# Patient Record
Sex: MALE | URBAN - METROPOLITAN AREA
[De-identification: names, ages, dates, MRNs, and addresses within clinical notes are randomized per-mention and may not be internally consistent; named-entity substitution may affect disease eponyms.]

---

## 2022-09-12 ENCOUNTER — APPOINTMENT (OUTPATIENT)
Dept: URBAN - METROPOLITAN AREA CLINIC 252 | Age: 27
Setting detail: DERMATOLOGY
End: 2022-09-13

## 2022-09-12 VITALS — HEIGHT: 71 IN | WEIGHT: 170 LBS

## 2022-09-12 DIAGNOSIS — L70.0 ACNE VULGARIS: ICD-10-CM

## 2022-09-12 PROCEDURE — OTHER ISOTRETINOIN INITIATION: OTHER

## 2022-09-12 PROCEDURE — OTHER VENIPUNCTURE: OTHER

## 2022-09-12 PROCEDURE — OTHER ORDER TESTS: OTHER

## 2022-09-12 PROCEDURE — 36415 COLL VENOUS BLD VENIPUNCTURE: CPT

## 2022-09-12 PROCEDURE — OTHER PRESCRIPTION: OTHER

## 2022-09-12 PROCEDURE — 99204 OFFICE O/P NEW MOD 45 MIN: CPT

## 2022-09-12 PROCEDURE — OTHER ADDITIONAL NOTES: OTHER

## 2022-09-12 RX ORDER — ISOTRETINOIN 20 MG/1
20MG CAPSULE, LIQUID FILLED ORAL
Qty: 30 | Refills: 0 | Status: ERX | COMMUNITY
Start: 2022-09-12

## 2022-09-12 ASSESSMENT — LOCATION SIMPLE DESCRIPTION DERM
LOCATION SIMPLE: LEFT CHEEK
LOCATION SIMPLE: LEFT UPPER ARM

## 2022-09-12 ASSESSMENT — LOCATION DETAILED DESCRIPTION DERM
LOCATION DETAILED: LEFT CENTRAL MALAR CHEEK
LOCATION DETAILED: LEFT ANTECUBITAL SKIN

## 2022-09-12 ASSESSMENT — LOCATION ZONE DERM
LOCATION ZONE: FACE
LOCATION ZONE: ARM

## 2022-09-12 NOTE — HPI: PIMPLES (ACNE)
What Type Of Note Output Would You Prefer (Optional)?: Bullet Format
How Severe Is Your Acne?: moderate
Is This A New Presentation, Or A Follow-Up?: Acne
Additional Comments (Use Complete Sentences): Spent $2500 on laser treatment\\nDrinks whey protein \\nNo hx of GI issues

## 2022-10-13 ENCOUNTER — APPOINTMENT (OUTPATIENT)
Dept: URBAN - METROPOLITAN AREA CLINIC 252 | Age: 27
Setting detail: DERMATOLOGY
End: 2022-10-14

## 2022-10-13 VITALS — WEIGHT: 170 LBS | RESPIRATION RATE: 16 BRPM | HEIGHT: 71 IN

## 2022-10-13 DIAGNOSIS — L70.0 ACNE VULGARIS: ICD-10-CM

## 2022-10-13 PROCEDURE — OTHER ISOTRETINOIN MONITORING: OTHER

## 2022-10-13 PROCEDURE — 99214 OFFICE O/P EST MOD 30 MIN: CPT

## 2022-10-13 PROCEDURE — OTHER PRESCRIPTION: OTHER

## 2022-10-13 PROCEDURE — OTHER MIPS QUALITY: OTHER

## 2022-10-13 RX ORDER — ISOTRETINOIN 40 MG/1
40MG CAPSULE, LIQUID FILLED ORAL
Qty: 30 | Refills: 0 | Status: ERX | COMMUNITY
Start: 2022-10-13

## 2022-10-13 ASSESSMENT — LOCATION ZONE DERM: LOCATION ZONE: FACE

## 2022-10-13 ASSESSMENT — LOCATION DETAILED DESCRIPTION DERM: LOCATION DETAILED: LEFT CENTRAL MALAR CHEEK

## 2022-10-13 ASSESSMENT — LOCATION SIMPLE DESCRIPTION DERM: LOCATION SIMPLE: LEFT CHEEK

## 2022-10-13 NOTE — PROCEDURE: ISOTRETINOIN MONITORING
Months Of Therapy Completed: 1
Headache Monitoring: I recommended monitoring the headaches for now. There is no evidence of increased intracranial pressure. They were instructed to call if the headaches are worsening.
Hypercholesterolemia Monitoring: I explained this is common when taking isotretinoin. We will monitor closely.
Use Enhanced Counseling Feature (Automatic): No
Upper Range (In Mg/Kg): 150
Cheilitis Aggressive Treatment: I recommended application of Vaseline or Aquaphor numerous times a day (as often as every hour) and before going to bed. I also prescribed a topical steroid for twice daily use.
Retinoid Dermatitis Normal Treatment: I recommended more frequent application of Cetaphil or CeraVe to the areas of dermatitis.
Retinoid Dermatitis Normal Treatment: - Recommended application of Cetaphil or CeraVe cream numerous times a day and before going to bed to all dry and rashy areas.
Pounds Preamble Statement (Weight Entered In Details Tab): Reported Weight in pounds:
Myalgia Treatment: I explained this is common when taking isotretinoin. If this worsens they will contact us. They may try OTC ibuprofen.
Xerosis Normal Treatment: - Recommended application of Cetaphil or CeraVe cream numerous times a day and before going to bed to all dry areas.
Retinoid Dermatitis Aggressive Treatment: I recommended more frequent application of Cetaphil or CeraVe to the areas of dermatitis. I also prescribed a topical steroid for twice daily use until the dermatitis resolves.
Use Therapeutic Ranged Or Therapeutic Target: please select Range or Target
Cheilitis Normal Treatment: - Recommended application of Vaseline ointment or Aquaphor numerous times a day (as often as every hour) and before going to bed.
Retinoid Dermatitis Aggressive Treatment: - Recommended application of Cetaphil or CeraVe cream numerous times a day and before going to bed to all dry and rashy areas.\\n- Also, will prescribe a topical steroid for twice daily use as needed until pruritus is resolved for never longer than 14 days per month.
Next Month's Dosage: Continue Current Dosage
Myalgia Monitoring: I explained this is common when taking isotretinoin. If this worsens they will contact us.
Lower Range (In Mg/Kg): 120
What Is The Patient's Gender: Male
Nosebleeds Normal Treatment: I explained this is common when taking isotretinoin. I recommended saline mist in each nostril multiple times a day. If this worsens they will contact us.
Male Completion Statement: - After discussing his treatment course we decided to discontinue isotretinoin therapy at this time.\\n- He shouldn't donate blood for one month after the last dose. He should call with any new symptoms of depression.\\n- Discussed maintenance options.
Xerosis Aggressive Treatment: I recommended application of Cetaphil or CeraVe numerous times a day going to bed to all dry areas. I also prescribed a topical steroid for twice daily use.
Weight Units: pounds
Cheilitis Normal Treatment: I recommended application of Vaseline or Aquaphor numerous times a day (as often as every hour) and before going to bed.
Show Text Field For Brand Names Of Contraception?: Yes
Cheilitis Aggressive Treatment: - Recommended application of Dr. Dan’s Cortibalm used several times per day (as often as every hour you are awake when very severe lip dryness is present).\\n- Once improved switch back to Vaseline or Aquaphor numerous times a day and before going to bed.
Hypertriglyceridemia Monitoring: - I explained this is common when taking isotretinoin. \\n- Advised that taking over-the-counter fish oil can be helpful to reduce triglycerides.\\n- We will continue to monitor.
Myalgia Treatment: - Explained this is common when taking isotretinoin. \\n- May try OTC ibuprofen no more frequently than every 6 hours.\\n- If this worsens, then contact us.
Xerosis Aggressive Treatment: - Recommended application of Cetaphil or CeraVe cream numerous times a day and before going to bed to all dry areas.\\n- Also, will prescribe a topical steroid for twice daily use as needed until pruritus is resolved for never longer than 14 days per month.
Dosing Month 1 (Required For Cumulative Dosing): 20mg Daily
Myalgia Monitoring: - At this point, patient reports that this is mild and tolerable. Explained this is common when taking isotretinoin. If this worsens, contact us.
Kilograms Preamble Statement (Weight Entered In Details Tab): Reported Weight in kilograms:
Female Pregnancy Counseling Text: Female patients should also be on two forms of birth control while taking this medication and for one month after their last dose.
Target Cumulative Dosage (In Mg/Kg): 135
Is Cheilitis Present?: Yes - Normal Treatment
Female Completion Statement: FEMALE PATIENT - FINAL VISIT \\n- Discussed that we will be discontinuing isotretinoin today.\\n- Reminded that it is still imperative to maintain 100% compliance with the same methods of birth control she has been using throughout this course of isotretinoin for at least 30 days beyond her final pill. \\n- Advise that a final urine pregnancy test will be necessary to complete the retirements of the [employer] program in 30-37 days from now. \\n- Advised to schedule a nurse visit for this final UPT.   \\n- Reviewed the possible expectations for her acne from here on out.\\n- Discussed maintenance options.\\n- Urine pregnant test performed today.
Headache Monitoring: - Recommended monitoring the headaches for now. There is no evidence of increased intracranial pressure. They were instructed to call if the headaches are worsening.
Detail Level: Zone
Counseling Text: I reviewed the side effect in detail. Patient should get monthly blood tests, not donate blood, not drive at night if vision affected, and not share medication.
Comments: Recommended fish oil. Won’t need labs till patient at highest dose.
Nosebleeds Normal Treatment: - I explained nosebleeds are common when taking isotretinoin. \\n- Recommended saline mist in each nostril multiple times a day. \\n- May also consider applying Aquaphor intranasally once every night.\\n- If this worsens, call the clinic. Patient expressed understanding.
Xerosis Normal Treatment: I recommended application of Cetaphil or CeraVe numerous times a day going to bed to all dry areas.

## 2022-11-16 ENCOUNTER — APPOINTMENT (OUTPATIENT)
Dept: URBAN - METROPOLITAN AREA CLINIC 252 | Age: 27
Setting detail: DERMATOLOGY
End: 2022-11-16

## 2022-11-16 VITALS — HEIGHT: 72 IN | WEIGHT: 173 LBS

## 2022-11-16 DIAGNOSIS — L70.0 ACNE VULGARIS: ICD-10-CM

## 2022-11-16 PROCEDURE — OTHER ISOTRETINOIN MONITORING: OTHER

## 2022-11-16 PROCEDURE — OTHER PRESCRIPTION: OTHER

## 2022-11-16 PROCEDURE — 99214 OFFICE O/P EST MOD 30 MIN: CPT

## 2022-11-16 RX ORDER — ISOTRETINOIN 30 MG/1
30 CAPSULE, LIQUID FILLED ORAL TWICE PER DAY
Qty: 60 | Refills: 0 | Status: ERX | COMMUNITY
Start: 2022-11-16

## 2022-11-16 ASSESSMENT — LOCATION DETAILED DESCRIPTION DERM: LOCATION DETAILED: LEFT CENTRAL MALAR CHEEK

## 2022-11-16 ASSESSMENT — LOCATION SIMPLE DESCRIPTION DERM: LOCATION SIMPLE: LEFT CHEEK

## 2022-11-16 ASSESSMENT — LOCATION ZONE DERM: LOCATION ZONE: FACE

## 2022-11-16 NOTE — PROCEDURE: ISOTRETINOIN MONITORING
Headache Monitoring: - Recommended monitoring the headaches for now. There is no evidence of increased intracranial pressure. They were instructed to call if the headaches are worsening.
Hypertriglyceridemia Monitoring: - I explained this is common when taking isotretinoin. \\n- Advised that taking over-the-counter fish oil can be helpful to reduce triglycerides.\\n- We will continue to monitor.
Add High Risk Medication Management Associated Diagnosis?: Yes
Retinoid Dermatitis Aggressive Treatment: I recommended more frequent application of Cetaphil or CeraVe to the areas of dermatitis. I also prescribed a topical steroid for twice daily use until the dermatitis resolves.
What Is The Patient's Gender: Male
Retinoid Dermatitis Aggressive Treatment: - Recommended application of Cetaphil or CeraVe cream numerous times a day and before going to bed to all dry and rashy areas.\\n- Also, will prescribe a topical steroid for twice daily use as needed until pruritus is resolved for never longer than 14 days per month.
Nosebleeds Normal Treatment: I explained this is common when taking isotretinoin. I recommended saline mist in each nostril multiple times a day. If this worsens they will contact us.
Cheilitis Normal Treatment: - Recommended application of Vaseline ointment or Aquaphor numerous times a day (as often as every hour) and before going to bed.
Counseling Text: I reviewed the side effect in detail. Patient should get monthly blood tests, not donate blood, not drive at night if vision affected, and not share medication.
Retinoid Dermatitis Normal Treatment: - Recommended application of Cetaphil or CeraVe cream numerous times a day and before going to bed to all dry and rashy areas.
Hypercholesterolemia Monitoring: I explained this is common when taking isotretinoin. We will monitor closely.
Use Enhanced Counseling Feature (Automatic): No
Detail Level: Zone
Target Cumulative Dosage (In Mg/Kg): 135
Upper Range (In Mg/Kg): 150
Xerosis Aggressive Treatment: I recommended application of Cetaphil or CeraVe numerous times a day going to bed to all dry areas. I also prescribed a topical steroid for twice daily use.
Female Completion Statement: FEMALE PATIENT - FINAL VISIT \\n- Discussed that we will be discontinuing isotretinoin today.\\n- Reminded that it is still imperative to maintain 100% compliance with the same methods of birth control she has been using throughout this course of isotretinoin for at least 30 days beyond her final pill. \\n- Advise that a final urine pregnancy test will be necessary to complete the retirements of the [employer] program in 30-37 days from now. \\n- Advised to schedule a nurse visit for this final UPT.   \\n- Reviewed the possible expectations for her acne from here on out.\\n- Discussed maintenance options.\\n- Urine pregnant test performed today.
Weight Units: pounds
Use Therapeutic Ranged Or Therapeutic Target: please select Range or Target
Myalgia Monitoring: - At this point, patient reports that this is mild and tolerable. Explained this is common when taking isotretinoin. If this worsens, contact us.
Myalgia Treatment: - Explained this is common when taking isotretinoin. \\n- May try OTC ibuprofen no more frequently than every 6 hours.\\n- If this worsens, then contact us.
Xerosis Aggressive Treatment: - Recommended application of Cetaphil or CeraVe cream numerous times a day and before going to bed to all dry areas.\\n- Also, will prescribe a topical steroid for twice daily use as needed until pruritus is resolved for never longer than 14 days per month.
Months Of Therapy Completed: 2
Myalgia Treatment: I explained this is common when taking isotretinoin. If this worsens they will contact us. They may try OTC ibuprofen.
Cheilitis Aggressive Treatment: - Recommended application of Dr. Dan’s Cortibalm used several times per day (as often as every hour you are awake when very severe lip dryness is present).\\n- Once improved switch back to Vaseline or Aquaphor numerous times a day and before going to bed.
Xerosis Normal Treatment: - Recommended application of Cetaphil or CeraVe cream numerous times a day and before going to bed to all dry areas.
Male Completion Statement: - After discussing his treatment course we decided to discontinue isotretinoin therapy at this time.\\n- He shouldn't donate blood for one month after the last dose. He should call with any new symptoms of depression.\\n- Discussed maintenance options.
Headache Monitoring: I recommended monitoring the headaches for now. There is no evidence of increased intracranial pressure. They were instructed to call if the headaches are worsening.
Nosebleeds Normal Treatment: - I explained nosebleeds are common when taking isotretinoin. \\n- Recommended saline mist in each nostril multiple times a day. \\n- May also consider applying Aquaphor intranasally once every night.\\n- If this worsens, call the clinic. Patient expressed understanding.
Xerosis Normal Treatment: I recommended application of Cetaphil or CeraVe numerous times a day going to bed to all dry areas.
Lower Range (In Mg/Kg): 120
Pounds Preamble Statement (Weight Entered In Details Tab): Reported Weight in pounds:
Kilograms Preamble Statement (Weight Entered In Details Tab): Reported Weight in kilograms:
Is Cheilitis Present?: Yes - Normal Treatment
Retinoid Dermatitis Normal Treatment: I recommended more frequent application of Cetaphil or CeraVe to the areas of dermatitis.
Next Month's Dosage: Continue Current Dosage
Female Pregnancy Counseling Text: Female patients should also be on two forms of birth control while taking this medication and for one month after their last dose.
Cheilitis Normal Treatment: I recommended application of Vaseline or Aquaphor numerous times a day (as often as every hour) and before going to bed.
Myalgia Monitoring: I explained this is common when taking isotretinoin. If this worsens they will contact us.
Cheilitis Aggressive Treatment: I recommended application of Vaseline or Aquaphor numerous times a day (as often as every hour) and before going to bed. I also prescribed a topical steroid for twice daily use.

## 2022-12-20 ENCOUNTER — APPOINTMENT (OUTPATIENT)
Dept: URBAN - METROPOLITAN AREA CLINIC 252 | Age: 27
Setting detail: DERMATOLOGY
End: 2022-12-26

## 2022-12-20 VITALS — HEIGHT: 66 IN | WEIGHT: 175 LBS | RESPIRATION RATE: 16 BRPM

## 2022-12-20 DIAGNOSIS — L70.0 ACNE VULGARIS: ICD-10-CM

## 2022-12-20 PROCEDURE — 99214 OFFICE O/P EST MOD 30 MIN: CPT

## 2022-12-20 PROCEDURE — OTHER ISOTRETINOIN MONITORING: OTHER

## 2022-12-20 PROCEDURE — OTHER PRESCRIPTION: OTHER

## 2022-12-20 PROCEDURE — OTHER MIPS QUALITY: OTHER

## 2022-12-20 RX ORDER — ISOTRETINOIN 30 MG/1
30 CAPSULE, LIQUID FILLED ORAL TWICE PER DAY
Qty: 60 | Refills: 0 | Status: ERX

## 2022-12-20 ASSESSMENT — LOCATION SIMPLE DESCRIPTION DERM: LOCATION SIMPLE: LEFT CHEEK

## 2022-12-20 ASSESSMENT — LOCATION ZONE DERM: LOCATION ZONE: FACE

## 2022-12-20 ASSESSMENT — LOCATION DETAILED DESCRIPTION DERM: LOCATION DETAILED: LEFT CENTRAL MALAR CHEEK

## 2022-12-20 NOTE — PROCEDURE: ISOTRETINOIN MONITORING
Upper Range (In Mg/Kg): 150
Nosebleeds Normal Treatment: - I explained nosebleeds are common when taking isotretinoin. \\n- Recommended saline mist in each nostril multiple times a day. \\n- May also consider applying Aquaphor intranasally once every night.\\n- If this worsens, call the clinic. Patient expressed understanding.
Use Enhanced Counseling Feature (Automatic): No
Next Month's Dosage: Continue Current Dosage
Headache Monitoring: I recommended monitoring the headaches for now. There is no evidence of increased intracranial pressure. They were instructed to call if the headaches are worsening.
Use Therapeutic Ranged Or Therapeutic Target: please select Range or Target
Comments: Will plan to increase to 40mg bid at next OV
Hypercholesterolemia Monitoring: I explained this is common when taking isotretinoin. We will monitor closely.
Retinoid Dermatitis Normal Treatment: I recommended more frequent application of Cetaphil or CeraVe to the areas of dermatitis.
Female Completion Statement: FEMALE PATIENT - FINAL VISIT \\n- Discussed that we will be discontinuing isotretinoin today.\\n- Reminded that it is still imperative to maintain 100% compliance with the same methods of birth control she has been using throughout this course of isotretinoin for at least 30 days beyond her final pill. \\n- Advise that a final urine pregnancy test will be necessary to complete the retirements of the [employer] program in 30-37 days from now. \\n- Advised to schedule a nurse visit for this final UPT.   \\n- Reviewed the possible expectations for her acne from here on out.\\n- Discussed maintenance options.\\n- Urine pregnant test performed today.
Hypertriglyceridemia Treatment: - I explained this is common when taking isotretinoin. \\n- Advised that taking over-the-counter fish oil can be helpful to reduce triglycerides.\\n- We will continue to monitor.
Weight Units: pounds
Myalgia Treatment: - Explained this is common when taking isotretinoin. \\n- May try OTC ibuprofen no more frequently than every 6 hours.\\n- If this worsens, then contact us.
Xerosis Aggressive Treatment: - Recommended application of Cetaphil or CeraVe cream numerous times a day and before going to bed to all dry areas.\\n- Also, will prescribe a topical steroid for twice daily use as needed until pruritus is resolved for never longer than 14 days per month.
Myalgia Treatment: I explained this is common when taking isotretinoin. If this worsens they will contact us. They may try OTC ibuprofen.
Lower Range (In Mg/Kg): 120
Detail Level: Zone
Counseling Text: I reviewed the side effect in detail. Patient should get monthly blood tests, not donate blood, not drive at night if vision affected, and not share medication.
Target Cumulative Dosage (In Mg/Kg): 135
Myalgia Monitoring: I explained this is common when taking isotretinoin. If this worsens they will contact us.
What Is The Patient's Gender: Male
Kilograms Preamble Statement (Weight Entered In Details Tab): Reported Weight in kilograms:
Pounds Preamble Statement (Weight Entered In Details Tab): Reported Weight in pounds:
Cheilitis Normal Treatment: I recommended application of Vaseline or Aquaphor numerous times a day (as often as every hour) and before going to bed.
Nosebleeds Normal Treatment: I explained this is common when taking isotretinoin. I recommended saline mist in each nostril multiple times a day. If this worsens they will contact us.
Myalgia Monitoring: - At this point, patient reports that this is mild and tolerable. Explained this is common when taking isotretinoin. If this worsens, contact us.
Cheilitis Aggressive Treatment: - Recommended application of Dr. Dan’s Cortibalm used several times per day (as often as every hour you are awake when very severe lip dryness is present).\\n- Once improved switch back to Vaseline or Aquaphor numerous times a day and before going to bed.
Months Of Therapy Completed: 3
Show Text Field For Brand Names Of Contraception?: Yes
Retinoid Dermatitis Aggressive Treatment: - Recommended application of Cetaphil or CeraVe cream numerous times a day and before going to bed to all dry and rashy areas.\\n- Also, will prescribe a topical steroid for twice daily use as needed until pruritus is resolved for never longer than 14 days per month.
Headache Monitoring: - Recommended monitoring the headaches for now. There is no evidence of increased intracranial pressure. They were instructed to call if the headaches are worsening.
Xerosis Normal Treatment: I recommended application of Cetaphil or CeraVe numerous times a day going to bed to all dry areas.
Male Completion Statement: - After discussing his treatment course we decided to discontinue isotretinoin therapy at this time.\\n- He shouldn't donate blood for one month after the last dose. He should call with any new symptoms of depression.\\n- Discussed maintenance options.
Retinoid Dermatitis Normal Treatment: - Recommended application of Cetaphil or CeraVe cream numerous times a day and before going to bed to all dry and rashy areas.
Cheilitis Aggressive Treatment: I recommended application of Vaseline or Aquaphor numerous times a day (as often as every hour) and before going to bed. I also prescribed a topical steroid for twice daily use.
Is Cheilitis Present?: Yes - Normal Treatment
Female Pregnancy Counseling Text: Female patients should also be on two forms of birth control while taking this medication and for one month after their last dose.
Xerosis Aggressive Treatment: I recommended application of Cetaphil or CeraVe numerous times a day going to bed to all dry areas. I also prescribed a topical steroid for twice daily use.
Retinoid Dermatitis Aggressive Treatment: I recommended more frequent application of Cetaphil or CeraVe to the areas of dermatitis. I also prescribed a topical steroid for twice daily use until the dermatitis resolves.
Cheilitis Normal Treatment: - Recommended application of Vaseline ointment or Aquaphor numerous times a day (as often as every hour) and before going to bed.
Xerosis Normal Treatment: - Recommended application of Cetaphil or CeraVe cream numerous times a day and before going to bed to all dry areas.

## 2023-01-25 ENCOUNTER — APPOINTMENT (OUTPATIENT)
Dept: URBAN - METROPOLITAN AREA CLINIC 252 | Age: 28
Setting detail: DERMATOLOGY
End: 2023-01-30

## 2023-01-25 VITALS — RESPIRATION RATE: 16 BRPM | HEIGHT: 72 IN | WEIGHT: 180 LBS

## 2023-01-25 DIAGNOSIS — L70.0 ACNE VULGARIS: ICD-10-CM

## 2023-01-25 PROCEDURE — OTHER ISOTRETINOIN MONITORING: OTHER

## 2023-01-25 PROCEDURE — 99214 OFFICE O/P EST MOD 30 MIN: CPT

## 2023-01-25 PROCEDURE — OTHER MIPS QUALITY: OTHER

## 2023-01-25 PROCEDURE — OTHER PRESCRIPTION: OTHER

## 2023-01-25 RX ORDER — ISOTRETINOIN 40 MG/1
40MG CAPSULE, LIQUID FILLED ORAL TWICE PER DAY
Qty: 60 | Refills: 0 | Status: ERX | COMMUNITY
Start: 2023-01-25

## 2023-01-25 ASSESSMENT — LOCATION SIMPLE DESCRIPTION DERM: LOCATION SIMPLE: LEFT CHEEK

## 2023-01-25 ASSESSMENT — LOCATION ZONE DERM: LOCATION ZONE: FACE

## 2023-01-25 ASSESSMENT — LOCATION DETAILED DESCRIPTION DERM: LOCATION DETAILED: LEFT CENTRAL MALAR CHEEK

## 2023-01-25 NOTE — PROCEDURE: ISOTRETINOIN MONITORING
What Is The Patient's Gender: Male
Male Completion Statement: - After discussing his treatment course we decided to discontinue isotretinoin therapy at this time.\\n- He shouldn't donate blood for one month after the last dose. He should call with any new symptoms of depression.\\n- Discussed maintenance options.
Counseling Text: I reviewed the side effect in detail. Patient should get monthly blood tests, not donate blood, not drive at night if vision affected, and not share medication.
Hypertriglyceridemia Monitoring: I explained this is common when taking isotretinoin. We will monitor closely.
Use Enhanced Counseling Feature (Automatic): No
Xerosis Aggressive Treatment: I recommended application of Cetaphil or CeraVe numerous times a day going to bed to all dry areas. I also prescribed a topical steroid for twice daily use.
Nosebleeds Normal Treatment: - I explained nosebleeds are common when taking isotretinoin. \\n- Recommended saline mist in each nostril multiple times a day. \\n- May also consider applying Aquaphor intranasally once every night.\\n- If this worsens, call the clinic. Patient expressed understanding.
Add High Risk Medication Management Associated Diagnosis?: Yes
Myalgia Monitoring: I explained this is common when taking isotretinoin. If this worsens they will contact us.
Retinoid Dermatitis Aggressive Treatment: I recommended more frequent application of Cetaphil or CeraVe to the areas of dermatitis. I also prescribed a topical steroid for twice daily use until the dermatitis resolves.
Headache Monitoring: I recommended monitoring the headaches for now. There is no evidence of increased intracranial pressure. They were instructed to call if the headaches are worsening.
Retinoid Dermatitis Normal Treatment: - Recommended application of Cetaphil or CeraVe cream numerous times a day and before going to bed to all dry and rashy areas.
Use Therapeutic Ranged Or Therapeutic Target: please select Range or Target
Myalgia Treatment: I explained this is common when taking isotretinoin. If this worsens they will contact us. They may try OTC ibuprofen.
Cheilitis Normal Treatment: I recommended application of Vaseline or Aquaphor numerous times a day (as often as every hour) and before going to bed.
Is Cheilitis Present?: Yes - Normal Treatment
Female Completion Statement: FEMALE PATIENT - FINAL VISIT \\n- Discussed that we will be discontinuing isotretinoin today.\\n- Reminded that it is still imperative to maintain 100% compliance with the same methods of birth control she has been using throughout this course of isotretinoin for at least 30 days beyond her final pill. \\n- Advise that a final urine pregnancy test will be necessary to complete the retirements of the [employer] program in 30-37 days from now. \\n- Advised to schedule a nurse visit for this final UPT.   \\n- Reviewed the possible expectations for her acne from here on out.\\n- Discussed maintenance options.\\n- Urine pregnant test performed today.
Cheilitis Aggressive Treatment: I recommended application of Vaseline or Aquaphor numerous times a day (as often as every hour) and before going to bed. I also prescribed a topical steroid for twice daily use.
Target Cumulative Dosage (In Mg/Kg): 135
Retinoid Dermatitis Aggressive Treatment: - Recommended application of Cetaphil or CeraVe cream numerous times a day and before going to bed to all dry and rashy areas.\\n- Also, will prescribe a topical steroid for twice daily use as needed until pruritus is resolved for never longer than 14 days per month.
Cheilitis Normal Treatment: - Recommended application of Vaseline ointment or Aquaphor numerous times a day (as often as every hour) and before going to bed.
Months Of Therapy Completed: 4
Xerosis Aggressive Treatment: - Recommended application of Cetaphil or CeraVe cream numerous times a day and before going to bed to all dry areas.\\n- Also, will prescribe a topical steroid for twice daily use as needed until pruritus is resolved for never longer than 14 days per month.
Lower Range (In Mg/Kg): 120
Hypertriglyceridemia Monitoring: - I explained this is common when taking isotretinoin. \\n- Advised that taking over-the-counter fish oil can be helpful to reduce triglycerides.\\n- We will continue to monitor.
Headache Monitoring: - Recommended monitoring the headaches for now. There is no evidence of increased intracranial pressure. They were instructed to call if the headaches are worsening.
Pounds Preamble Statement (Weight Entered In Details Tab): Reported Weight in pounds:
Female Pregnancy Counseling Text: Female patients should also be on two forms of birth control while taking this medication and for one month after their last dose.
Kilograms Preamble Statement (Weight Entered In Details Tab): Reported Weight in kilograms:
Myalgia Treatment: - Explained this is common when taking isotretinoin. \\n- May try OTC ibuprofen no more frequently than every 6 hours.\\n- If this worsens, then contact us.
Retinoid Dermatitis Normal Treatment: I recommended more frequent application of Cetaphil or CeraVe to the areas of dermatitis.
Detail Level: Zone
Xerosis Normal Treatment: I recommended application of Cetaphil or CeraVe numerous times a day going to bed to all dry areas.
Myalgia Monitoring: - At this point, patient reports that this is mild and tolerable. Explained this is common when taking isotretinoin. If this worsens, contact us.
Weight Units: pounds
Comments: Will plan to get fasting labs next OV\\nDiscussed skin penning or tretinoin after pt completes isotretinoin course.
Cheilitis Aggressive Treatment: - Recommended application of Dr. Dan’s Cortibalm used several times per day (as often as every hour you are awake when very severe lip dryness is present).\\n- Once improved switch back to Vaseline or Aquaphor numerous times a day and before going to bed.
Next Month's Dosage: Continue Current Dosage
Upper Range (In Mg/Kg): 150
Xerosis Normal Treatment: - Recommended application of Cetaphil or CeraVe cream numerous times a day and before going to bed to all dry areas.
Nosebleeds Normal Treatment: I explained this is common when taking isotretinoin. I recommended saline mist in each nostril multiple times a day. If this worsens they will contact us.

## 2023-01-31 ENCOUNTER — APPOINTMENT (OUTPATIENT)
Dept: URBAN - METROPOLITAN AREA CLINIC 252 | Age: 28
Setting detail: DERMATOLOGY
End: 2023-01-31

## 2023-02-16 RX ORDER — ISOTRETINOIN 40 MG/1
40MG CAPSULE, LIQUID FILLED ORAL TWICE PER DAY
Qty: 60 | Refills: 0 | Status: ERX

## 2023-03-28 ENCOUNTER — APPOINTMENT (OUTPATIENT)
Dept: URBAN - METROPOLITAN AREA CLINIC 252 | Age: 28
Setting detail: DERMATOLOGY
End: 2023-03-28

## 2023-03-28 VITALS — WEIGHT: 178 LBS | HEIGHT: 71 IN

## 2023-03-28 DIAGNOSIS — L70.0 ACNE VULGARIS: ICD-10-CM

## 2023-03-28 PROCEDURE — OTHER PRESCRIPTION: OTHER

## 2023-03-28 PROCEDURE — 99213 OFFICE O/P EST LOW 20 MIN: CPT

## 2023-03-28 PROCEDURE — OTHER ISOTRETINOIN MONITORING: OTHER

## 2023-03-28 RX ORDER — ISOTRETINOIN 30 MG/1
30MG CAPSULE, LIQUID FILLED ORAL TWICE PER DAY
Qty: 60 | Refills: 0 | Status: ERX

## 2023-03-28 ASSESSMENT — LOCATION ZONE DERM: LOCATION ZONE: FACE

## 2023-03-28 ASSESSMENT — LOCATION DETAILED DESCRIPTION DERM: LOCATION DETAILED: LEFT CENTRAL MALAR CHEEK

## 2023-03-28 ASSESSMENT — LOCATION SIMPLE DESCRIPTION DERM: LOCATION SIMPLE: LEFT CHEEK

## 2023-03-28 NOTE — PROCEDURE: ISOTRETINOIN MONITORING
What Is The Patient's Gender: Male
Xerosis Aggressive Treatment: I recommended application of Cetaphil or CeraVe numerous times a day going to bed to all dry areas. I also prescribed a topical steroid for twice daily use.
Is Cheilitis Present?: Yes - Normal Treatment
Detail Level: Zone
Female Completion Statement: FEMALE PATIENT - FINAL VISIT \\n- Discussed that we will be discontinuing isotretinoin today.\\n- Reminded that it is still imperative to maintain 100% compliance with the same methods of birth control she has been using throughout this course of isotretinoin for at least 30 days beyond her final pill. \\n- Advise that a final urine pregnancy test will be necessary to complete the retirements of the [employer] program in 30-37 days from now. \\n- Advised to schedule a nurse visit for this final UPT.   \\n- Reviewed the possible expectations for her acne from here on out.\\n- Discussed maintenance options.\\n- Urine pregnant test performed today.
Nosebleeds Normal Treatment: - I explained nosebleeds are common when taking isotretinoin. \\n- Recommended saline mist in each nostril multiple times a day. \\n- May also consider applying Aquaphor intranasally once every night.\\n- If this worsens, call the clinic. Patient expressed understanding.
Use Enhanced Counseling Feature (Automatic): No
Next Month's Dosage: Continue Current Dosage
Headache Monitoring: I recommended monitoring the headaches for now. There is no evidence of increased intracranial pressure. They were instructed to call if the headaches are worsening.
Show Text Field For Brand Names Of Contraception?: Yes
Hypercholesterolemia Monitoring: I explained this is common when taking isotretinoin. We will monitor closely.
Female Pregnancy Counseling Text: Female patients should also be on two forms of birth control while taking this medication and for one month after their last dose.
Xerosis Normal Treatment: I recommended application of Cetaphil or CeraVe numerous times a day going to bed to all dry areas.
Myalgia Treatment: I explained this is common when taking isotretinoin. If this worsens they will contact us. They may try OTC ibuprofen.
Myalgia Monitoring: - At this point, patient reports that this is mild and tolerable. Explained this is common when taking isotretinoin. If this worsens, contact us.
Nosebleeds Normal Treatment: I explained this is common when taking isotretinoin. I recommended saline mist in each nostril multiple times a day. If this worsens they will contact us.
Hypertriglyceridemia Treatment: - I explained this is common when taking isotretinoin. \\n- Advised that taking over-the-counter fish oil can be helpful to reduce triglycerides.\\n- We will continue to monitor.
Retinoid Dermatitis Normal Treatment: - Recommended application of Cetaphil or CeraVe cream numerous times a day and before going to bed to all dry and rashy areas.\\n
Cheilitis Normal Treatment: - Recommended application of Vaseline ointment or Aquaphor numerous times a day (as often as every hour) and before going to bed.
Cheilitis Aggressive Treatment: - Recommended application of Dr. Dan’s Cortibalm used several times per day (as often as every hour you are awake when very severe lip dryness is present).\\n- Once improved switch back to Vaseline or Aquaphor numerous times a day and before going to bed.
Myalgia Treatment: - Explained this is common when taking isotretinoin. \\n- May try OTC ibuprofen no more frequently than every 6 hours.\\n- If this worsens, then contact us.
Kilograms Preamble Statement (Weight Entered In Details Tab): Reported Weight in kilograms:
Months Of Therapy Completed: 5
Xerosis Aggressive Treatment: - Recommended application of Cetaphil or CeraVe cream numerous times a day and before going to bed to all dry areas.\\n- Also, will prescribe a topical steroid for twice daily use as needed until pruritus is resolved for never longer than 14 days per month.
Target Cumulative Dosage (In Mg/Kg): 135
Male Completion Statement: - After discussing his treatment course we decided to discontinue isotretinoin therapy at this time.\\n- He shouldn't donate blood for one month after the last dose. He should call with any new symptoms of depression.\\n- Discussed maintenance options.
Counseling Text: I reviewed the side effect in detail. Patient should get monthly blood tests, not donate blood, not drive at night if vision affected, and not share medication.
Retinoid Dermatitis Normal Treatment: I recommended more frequent application of Cetaphil or CeraVe to the areas of dermatitis.
Weight Units: pounds
Pounds Preamble Statement (Weight Entered In Details Tab): Reported Weight in pounds:
Lower Range (In Mg/Kg): 120
Retinoid Dermatitis Aggressive Treatment: I recommended more frequent application of Cetaphil or CeraVe to the areas of dermatitis. I also prescribed a topical steroid for twice daily use until the dermatitis resolves.
Upper Range (In Mg/Kg): 150
Cheilitis Normal Treatment: I recommended application of Vaseline or Aquaphor numerous times a day (as often as every hour) and before going to bed.
Headache Monitoring: - Recommended monitoring the headaches for now. There is no evidence of increased intracranial pressure. They were instructed to call if the headaches are worsening.
Myalgia Monitoring: I explained this is common when taking isotretinoin. If this worsens they will contact us.
Xerosis Normal Treatment: - Recommended application of Cetaphil or CeraVe cream numerous times a day and before going to bed to all dry areas.
Cheilitis Aggressive Treatment: I recommended application of Vaseline or Aquaphor numerous times a day (as often as every hour) and before going to bed. I also prescribed a topical steroid for twice daily use.
Retinoid Dermatitis Aggressive Treatment: - Recommended application of Cetaphil or CeraVe cream numerous times a day and before going to bed to all dry and rashy areas.\\n- Also, will prescribe a topical steroid for twice daily use as needed until pruritus is resolved for never longer than 14 days per month.
Use Therapeutic Ranged Or Therapeutic Target: please select Range or Target

## 2023-03-28 NOTE — HPI: MEDICATION (ISOTRETINOIN)
How Severe Is It?: moderate
Is This A New Presentation, Or A Follow-Up?: Follow Up Isotretinoin
Additional History: No new pimples this past month as well as last month. Patient is experiencing the side effect of dryness, but reports that the dryness is tolerable and mangeable. Patient is experiencing the side effect of cheilitis but this manageable. Patient denies any mood changes including denying depression/sadness and denies diarrhea or abdominal discomfort.

## 2023-10-09 ENCOUNTER — APPOINTMENT (OUTPATIENT)
Dept: URBAN - METROPOLITAN AREA CLINIC 252 | Age: 28
Setting detail: DERMATOLOGY
End: 2023-10-09

## 2023-10-09 VITALS — HEIGHT: 71 IN | WEIGHT: 168 LBS | RESPIRATION RATE: 16 BRPM

## 2023-10-09 DIAGNOSIS — L70.0 ACNE VULGARIS: ICD-10-CM

## 2023-10-09 PROCEDURE — OTHER COUNSELING: OTHER

## 2023-10-09 PROCEDURE — OTHER PRESCRIPTION: OTHER

## 2023-10-09 PROCEDURE — 99213 OFFICE O/P EST LOW 20 MIN: CPT

## 2023-10-09 RX ORDER — CLINDAMYCIN PHOSPHATE 10 MG/G
GEL TOPICAL BID
Qty: 60 | Refills: 11 | Status: ERX | COMMUNITY
Start: 2023-10-09

## 2023-10-09 ASSESSMENT — LOCATION ZONE DERM: LOCATION ZONE: ARM

## 2023-10-09 ASSESSMENT — LOCATION DETAILED DESCRIPTION DERM
LOCATION DETAILED: RIGHT POSTERIOR SHOULDER
LOCATION DETAILED: LEFT POSTERIOR SHOULDER

## 2023-10-09 ASSESSMENT — LOCATION SIMPLE DESCRIPTION DERM
LOCATION SIMPLE: LEFT SHOULDER
LOCATION SIMPLE: RIGHT SHOULDER

## 2023-10-09 NOTE — PROCEDURE: COUNSELING
Azelaic Acid Counseling: Patient counseled that medicine may cause skin irritation and to avoid applying near the eyes.  In the event of skin irritation, the patient was advised to reduce the amount of the drug applied or use it less frequently.   The patient verbalized understanding of the proper use and possible adverse effects of azelaic acid.  All of the patient's questions and concerns were addressed.
Benzoyl Peroxide Counseling: Patient counseled that medicine may cause skin irritation and bleach clothing.  In the event of skin irritation, the patient was advised to reduce the amount of the drug applied or use it less frequently.   The patient verbalized understanding of the proper use and possible adverse effects of benzoyl peroxide.  All of the patient's questions and concerns were addressed.
Birth Control Pills Pregnancy And Lactation Text: This medication should be avoided if pregnant and for the first 30 days post-partum.
Topical Sulfur Applications Pregnancy And Lactation Text: This medication is Pregnancy Category C and has an unknown safety profile during pregnancy. It is unknown if this topical medication is excreted in breast milk.
Isotretinoin Counseling: Patient should get monthly blood tests, not donate blood, not drive at night if vision affected, not share medication, and not undergo elective surgery for 6 months after tx completed. Side effects reviewed, pt to contact office should one occur.
High Dose Vitamin A Counseling: Side effects reviewed, pt to contact office should one occur.
Spironolactone Pregnancy And Lactation Text: This medication can cause feminization of the male fetus and should be avoided during pregnancy. The active metabolite is also found in breast milk.
Winlevi Pregnancy And Lactation Text: This medication is considered safe during pregnancy and breastfeeding.
Tetracycline Pregnancy And Lactation Text: This medication is Pregnancy Category D and not consider safe during pregnancy. It is also excreted in breast milk.
Topical Retinoid counseling:  Patient advised to apply a pea-sized amount only at bedtime and wait 30 minutes after washing their face before applying.  If too drying, patient may add a non-comedogenic moisturizer. The patient verbalized understanding of the proper use and possible adverse effects of retinoids.  All of the patient's questions and concerns were addressed.
High Dose Vitamin A Pregnancy And Lactation Text: High dose vitamin A therapy is contraindicated during pregnancy and breast feeding.
Tetracycline Counseling: Patient counseled regarding possible photosensitivity and increased risk for sunburn.  Patient instructed to avoid sunlight, if possible.  When exposed to sunlight, patients should wear protective clothing, sunglasses, and sunscreen.  The patient was instructed to call the office immediately if the following severe adverse effects occur:  hearing changes, easy bruising/bleeding, severe headache, or vision changes.  The patient verbalized understanding of the proper use and possible adverse effects of tetracycline.  All of the patient's questions and concerns were addressed. Patient understands to avoid pregnancy while on therapy due to potential birth defects.
Azithromycin Pregnancy And Lactation Text: This medication is considered safe during pregnancy and is also secreted in breast milk.
Aklief counseling:  Patient advised to apply a pea-sized amount only at bedtime and wait 30 minutes after washing their face before applying.  If too drying, patient may add a non-comedogenic moisturizer.  The most commonly reported side effects including irritation, redness, scaling, dryness, stinging, burning, itching, and increased risk of sunburn.  The patient verbalized understanding of the proper use and possible adverse effects of retinoids.  All of the patient's questions and concerns were addressed.
Erythromycin Counseling:  I discussed with the patient the risks of erythromycin including but not limited to GI upset, allergic reaction, drug rash, diarrhea, increase in liver enzymes, and yeast infections.
Tazorac Pregnancy And Lactation Text: This medication is not safe during pregnancy. It is unknown if this medication is excreted in breast milk.
Include Pregnancy/Lactation Warning?: No
Bactrim Pregnancy And Lactation Text: This medication is Pregnancy Category D and is known to cause fetal risk.  It is also excreted in breast milk.
Patient Specific Counseling (Will Not Stick From Patient To Patient): **discussed continuing accutane maintenance plan vs topicals. Patient opted for topicals. Patient will follow up if acne gets worse.\\n**discussed skin pen for scarring, med spa given today\\n**discussed moxi
Topical Clindamycin Pregnancy And Lactation Text: This medication is Pregnancy Category B and is considered safe during pregnancy. It is unknown if it is excreted in breast milk.
Topical Sulfur Applications Counseling: Topical Sulfur Counseling: Patient counseled that this medication may cause skin irritation or allergic reactions.  In the event of skin irritation, the patient was advised to reduce the amount of the drug applied or use it less frequently.   The patient verbalized understanding of the proper use and possible adverse effects of topical sulfur application.  All of the patient's questions and concerns were addressed.
Erythromycin Pregnancy And Lactation Text: This medication is Pregnancy Category B and is considered safe during pregnancy. It is also excreted in breast milk.
Birth Control Pills Counseling: Birth Control Pill Counseling: I discussed with the patient the potential side effects of OCPs including but not limited to increased risk of stroke, heart attack, thrombophlebitis, deep venous thrombosis, hepatic adenomas, breast changes, GI upset, headaches, and depression.  The patient verbalized understanding of the proper use and possible adverse effects of OCPs. All of the patient's questions and concerns were addressed.
Isotretinoin Pregnancy And Lactation Text: This medication is Pregnancy Category X and is considered extremely dangerous during pregnancy. It is unknown if it is excreted in breast milk.
Spironolactone Counseling: Patient advised regarding risks of diarrhea, abdominal pain, hyperkalemia, birth defects (for female patients), liver toxicity and renal toxicity. The patient may need blood work to monitor liver and kidney function and potassium levels while on therapy. The patient verbalized understanding of the proper use and possible adverse effects of spironolactone.  All of the patient's questions and concerns were addressed.
Dapsone Counseling: I discussed with the patient the risks of dapsone including but not limited to hemolytic anemia, agranulocytosis, rashes, methemoglobinemia, kidney failure, peripheral neuropathy, headaches, GI upset, and liver toxicity.  Patients who start dapsone require monitoring including baseline LFTs and weekly CBCs for the first month, then every month thereafter.  The patient verbalized understanding of the proper use and possible adverse effects of dapsone.  All of the patient's questions and concerns were addressed.
Benzoyl Peroxide Pregnancy And Lactation Text: This medication is Pregnancy Category C. It is unknown if benzoyl peroxide is excreted in breast milk.
Winlevi Counseling:  I discussed with the patient the risks of topical clascoterone including but not limited to erythema, scaling, itching, and stinging. Patient voiced their understanding.
Doxycycline Counseling:  Patient counseled regarding possible photosensitivity and increased risk for sunburn.  Patient instructed to avoid sunlight, if possible.  When exposed to sunlight, patients should wear protective clothing, sunglasses, and sunscreen.  The patient was instructed to call the office immediately if the following severe adverse effects occur:  hearing changes, easy bruising/bleeding, severe headache, or vision changes.  The patient verbalized understanding of the proper use and possible adverse effects of doxycycline.  All of the patient's questions and concerns were addressed.
Topical Retinoid Pregnancy And Lactation Text: This medication is Pregnancy Category C. It is unknown if this medication is excreted in breast milk.
Detail Level: Zone
Azithromycin Counseling:  I discussed with the patient the risks of azithromycin including but not limited to GI upset, allergic reaction, drug rash, diarrhea, and yeast infections.
Dapsone Pregnancy And Lactation Text: This medication is Pregnancy Category C and is not considered safe during pregnancy or breast feeding.
Doxycycline Pregnancy And Lactation Text: This medication is Pregnancy Category D and not consider safe during pregnancy. It is also excreted in breast milk but is considered safe for shorter treatment courses.
Tazorac Counseling:  Patient advised that medication is irritating and drying.  Patient may need to apply sparingly and wash off after an hour before eventually leaving it on overnight.  The patient verbalized understanding of the proper use and possible adverse effects of tazorac.  All of the patient's questions and concerns were addressed.
Minocycline Counseling: Patient advised regarding possible photosensitivity and discoloration of the teeth, skin, lips, tongue and gums.  Patient instructed to avoid sunlight, if possible.  When exposed to sunlight, patients should wear protective clothing, sunglasses, and sunscreen.  The patient was instructed to call the office immediately if the following severe adverse effects occur:  hearing changes, easy bruising/bleeding, severe headache, or vision changes.  The patient verbalized understanding of the proper use and possible adverse effects of minocycline.  All of the patient's questions and concerns were addressed.
Aklief Pregnancy And Lactation Text: It is unknown if this medication is safe to use during pregnancy.  It is unknown if this medication is excreted in breast milk.  Breastfeeding women should use the topical cream on the smallest area of the skin for the shortest time needed while breastfeeding.  Do not apply to nipple and areola.
Bactrim Counseling:  I discussed with the patient the risks of sulfa antibiotics including but not limited to GI upset, allergic reaction, drug rash, diarrhea, dizziness, photosensitivity, and yeast infections.  Rarely, more serious reactions can occur including but not limited to aplastic anemia, agranulocytosis, methemoglobinemia, blood dyscrasias, liver or kidney failure, lung infiltrates or desquamative/blistering drug rashes.
Sarecycline Counseling: Patient advised regarding possible photosensitivity and discoloration of the teeth, skin, lips, tongue and gums.  Patient instructed to avoid sunlight, if possible.  When exposed to sunlight, patients should wear protective clothing, sunglasses, and sunscreen.  The patient was instructed to call the office immediately if the following severe adverse effects occur:  hearing changes, easy bruising/bleeding, severe headache, or vision changes.  The patient verbalized understanding of the proper use and possible adverse effects of sarecycline.  All of the patient's questions and concerns were addressed.
Topical Clindamycin Counseling: Patient counseled that this medication may cause skin irritation or allergic reactions.  In the event of skin irritation, the patient was advised to reduce the amount of the drug applied or use it less frequently.   The patient verbalized understanding of the proper use and possible adverse effects of clindamycin.  All of the patient's questions and concerns were addressed.
Azelaic Acid Pregnancy And Lactation Text: This medication is considered safe during pregnancy and breast feeding.

## 2023-11-29 ENCOUNTER — APPOINTMENT (OUTPATIENT)
Dept: URBAN - METROPOLITAN AREA CLINIC 256 | Age: 28
Setting detail: DERMATOLOGY
End: 2023-11-29

## 2023-11-29 ENCOUNTER — APPOINTMENT (OUTPATIENT)
Dept: URBAN - METROPOLITAN AREA CLINIC 256 | Age: 28
Setting detail: DERMATOLOGY
End: 2023-11-30

## 2023-11-29 DIAGNOSIS — Z41.9 ENCOUNTER FOR PROCEDURE FOR PURPOSES OTHER THAN REMEDYING HEALTH STATE, UNSPECIFIED: ICD-10-CM

## 2023-11-29 PROCEDURE — OTHER SKINPEN: OTHER

## 2023-11-29 ASSESSMENT — LOCATION SIMPLE DESCRIPTION DERM: LOCATION SIMPLE: LEFT CHEEK

## 2023-11-29 ASSESSMENT — LOCATION ZONE DERM: LOCATION ZONE: FACE

## 2023-11-29 ASSESSMENT — LOCATION DETAILED DESCRIPTION DERM: LOCATION DETAILED: LEFT CENTRAL MALAR CHEEK

## 2023-11-29 NOTE — PROCEDURE: SKINPEN
Treatment Number (Optional): 1
Post-Care Instructions: After the procedure, take precautions agains sun exposure. Do not apply sunscreen for 12 hours after the procedure. Do not apply make-up for 12 hours after the procedure. Avoid alcohol based toners for 10-14 days. After 2-3 days patients can return to their regular skin regimen.
Location #5: chin
Location #2: Nose
Location #4: upper lip
Location #3: Cheeks
Depth In Mm: 1.5
Location #1: forehead/temple
Depth In Mm: 0.75
Depth In Mm: 0.5
Consent: Written consent obtained, risks reviewed including but not limited to pain, scarring, infection and incomplete improvement.  Patient understands the procedure is cosmetic in nature and will require out of pocket payment.
Topical Anesthesia?: 23% lidocaine, 7% tetracaine, 2% phenylephrine
Detail Level: Zone

## 2024-02-09 ENCOUNTER — APPOINTMENT (OUTPATIENT)
Dept: URBAN - METROPOLITAN AREA CLINIC 252 | Age: 29
Setting detail: DERMATOLOGY
End: 2024-02-09

## 2024-02-09 VITALS — HEIGHT: 72 IN | WEIGHT: 165 LBS

## 2024-02-09 DIAGNOSIS — L70.0 ACNE VULGARIS: ICD-10-CM

## 2024-02-09 PROCEDURE — 99214 OFFICE O/P EST MOD 30 MIN: CPT

## 2024-02-09 PROCEDURE — OTHER PRESCRIPTION: OTHER

## 2024-02-09 PROCEDURE — OTHER COUNSELING: OTHER

## 2024-02-09 RX ORDER — TRETIONIN 0.25 MG/G
0.025% CREAM TOPICAL QHS
Qty: 45 | Refills: 11 | Status: ERX | COMMUNITY
Start: 2024-02-09

## 2024-02-09 RX ORDER — CLINDAMYCIN PHOSPHATE 10 MG/G
1% GEL TOPICAL BID
Qty: 30 | Refills: 11 | Status: ERX

## 2024-02-09 ASSESSMENT — LOCATION ZONE DERM
LOCATION ZONE: FACE
LOCATION ZONE: TRUNK

## 2024-02-09 ASSESSMENT — LOCATION DETAILED DESCRIPTION DERM
LOCATION DETAILED: LEFT CENTRAL MALAR CHEEK
LOCATION DETAILED: RIGHT SUPERIOR MEDIAL UPPER BACK

## 2024-02-09 ASSESSMENT — LOCATION SIMPLE DESCRIPTION DERM
LOCATION SIMPLE: RIGHT UPPER BACK
LOCATION SIMPLE: LEFT CHEEK

## 2024-02-09 NOTE — PROCEDURE: COUNSELING
Topical Clindamycin Counseling: Patient counseled that this medication may cause skin irritation or allergic reactions.  In the event of skin irritation, the patient was advised to reduce the amount of the drug applied or use it less frequently.   The patient verbalized understanding of the proper use and possible adverse effects of clindamycin.  All of the patient's questions and concerns were addressed.
Isotretinoin Pregnancy And Lactation Text: This medication is Pregnancy Category X and is considered extremely dangerous during pregnancy. It is unknown if it is excreted in breast milk.
Spironolactone Counseling: Patient advised regarding risks of diarrhea, abdominal pain, hyperkalemia, birth defects (for female patients), liver toxicity and renal toxicity. The patient may need blood work to monitor liver and kidney function and potassium levels while on therapy. The patient verbalized understanding of the proper use and possible adverse effects of spironolactone.  All of the patient's questions and concerns were addressed.
Azelaic Acid Pregnancy And Lactation Text: This medication is considered safe during pregnancy and breast feeding.
Doxycycline Counseling:  Patient counseled regarding possible photosensitivity and increased risk for sunburn.  Patient instructed to avoid sunlight, if possible.  When exposed to sunlight, patients should wear protective clothing, sunglasses, and sunscreen.  The patient was instructed to call the office immediately if the following severe adverse effects occur:  hearing changes, easy bruising/bleeding, severe headache, or vision changes.  The patient verbalized understanding of the proper use and possible adverse effects of doxycycline.  All of the patient's questions and concerns were addressed.
High Dose Vitamin A Pregnancy And Lactation Text: High dose vitamin A therapy is contraindicated during pregnancy and breast feeding.
Tetracycline Counseling: Patient counseled regarding possible photosensitivity and increased risk for sunburn.  Patient instructed to avoid sunlight, if possible.  When exposed to sunlight, patients should wear protective clothing, sunglasses, and sunscreen.  The patient was instructed to call the office immediately if the following severe adverse effects occur:  hearing changes, easy bruising/bleeding, severe headache, or vision changes.  The patient verbalized understanding of the proper use and possible adverse effects of tetracycline.  All of the patient's questions and concerns were addressed. Patient understands to avoid pregnancy while on therapy due to potential birth defects.
Benzoyl Peroxide Pregnancy And Lactation Text: This medication is Pregnancy Category C. It is unknown if benzoyl peroxide is excreted in breast milk.
Topical Sulfur Applications Counseling: Topical Sulfur Counseling: Patient counseled that this medication may cause skin irritation or allergic reactions.  In the event of skin irritation, the patient was advised to reduce the amount of the drug applied or use it less frequently.   The patient verbalized understanding of the proper use and possible adverse effects of topical sulfur application.  All of the patient's questions and concerns were addressed.
Dapsone Counseling: I discussed with the patient the risks of dapsone including but not limited to hemolytic anemia, agranulocytosis, rashes, methemoglobinemia, kidney failure, peripheral neuropathy, headaches, GI upset, and liver toxicity.  Patients who start dapsone require monitoring including baseline LFTs and weekly CBCs for the first month, then every month thereafter.  The patient verbalized understanding of the proper use and possible adverse effects of dapsone.  All of the patient's questions and concerns were addressed.
Tetracycline Pregnancy And Lactation Text: This medication is Pregnancy Category D and not consider safe during pregnancy. It is also excreted in breast milk.
Doxycycline Pregnancy And Lactation Text: This medication is Pregnancy Category D and not consider safe during pregnancy. It is also excreted in breast milk but is considered safe for shorter treatment courses.
Minocycline Counseling: Patient advised regarding possible photosensitivity and discoloration of the teeth, skin, lips, tongue and gums.  Patient instructed to avoid sunlight, if possible.  When exposed to sunlight, patients should wear protective clothing, sunglasses, and sunscreen.  The patient was instructed to call the office immediately if the following severe adverse effects occur:  hearing changes, easy bruising/bleeding, severe headache, or vision changes.  The patient verbalized understanding of the proper use and possible adverse effects of minocycline.  All of the patient's questions and concerns were addressed.
Include Pregnancy/Lactation Warning?: No
Erythromycin Pregnancy And Lactation Text: This medication is Pregnancy Category B and is considered safe during pregnancy. It is also excreted in breast milk.
Birth Control Pills Counseling: Birth Control Pill Counseling: I discussed with the patient the potential side effects of OCPs including but not limited to increased risk of stroke, heart attack, thrombophlebitis, deep venous thrombosis, hepatic adenomas, breast changes, GI upset, headaches, and depression.  The patient verbalized understanding of the proper use and possible adverse effects of OCPs. All of the patient's questions and concerns were addressed.
Aklief Pregnancy And Lactation Text: It is unknown if this medication is safe to use during pregnancy.  It is unknown if this medication is excreted in breast milk.  Breastfeeding women should use the topical cream on the smallest area of the skin for the shortest time needed while breastfeeding.  Do not apply to nipple and areola.
Sarecycline Counseling: Patient advised regarding possible photosensitivity and discoloration of the teeth, skin, lips, tongue and gums.  Patient instructed to avoid sunlight, if possible.  When exposed to sunlight, patients should wear protective clothing, sunglasses, and sunscreen.  The patient was instructed to call the office immediately if the following severe adverse effects occur:  hearing changes, easy bruising/bleeding, severe headache, or vision changes.  The patient verbalized understanding of the proper use and possible adverse effects of sarecycline.  All of the patient's questions and concerns were addressed.
Winlevi Pregnancy And Lactation Text: This medication is considered safe during pregnancy and breastfeeding.
Tazorac Counseling:  Patient advised that medication is irritating and drying.  Patient may need to apply sparingly and wash off after an hour before eventually leaving it on overnight.  The patient verbalized understanding of the proper use and possible adverse effects of tazorac.  All of the patient's questions and concerns were addressed.
Bactrim Counseling:  I discussed with the patient the risks of sulfa antibiotics including but not limited to GI upset, allergic reaction, drug rash, diarrhea, dizziness, photosensitivity, and yeast infections.  Rarely, more serious reactions can occur including but not limited to aplastic anemia, agranulocytosis, methemoglobinemia, blood dyscrasias, liver or kidney failure, lung infiltrates or desquamative/blistering drug rashes.
Isotretinoin Counseling: Patient should get monthly blood tests, not donate blood, not drive at night if vision affected, not share medication, and not undergo elective surgery for 6 months after tx completed. Side effects reviewed, pt to contact office should one occur.
Birth Control Pills Pregnancy And Lactation Text: This medication should be avoided if pregnant and for the first 30 days post-partum.
Topical Clindamycin Pregnancy And Lactation Text: This medication is Pregnancy Category B and is considered safe during pregnancy. It is unknown if it is excreted in breast milk.
Topical Retinoid counseling:  Patient advised to apply a pea-sized amount only at bedtime and wait 30 minutes after washing their face before applying.  If too drying, patient may add a non-comedogenic moisturizer. The patient verbalized understanding of the proper use and possible adverse effects of retinoids.  All of the patient's questions and concerns were addressed.
Azithromycin Counseling:  I discussed with the patient the risks of azithromycin including but not limited to GI upset, allergic reaction, drug rash, diarrhea, and yeast infections.
Dapsone Pregnancy And Lactation Text: This medication is Pregnancy Category C and is not considered safe during pregnancy or breast feeding.
Topical Sulfur Applications Pregnancy And Lactation Text: This medication is Pregnancy Category C and has an unknown safety profile during pregnancy. It is unknown if this topical medication is excreted in breast milk.
High Dose Vitamin A Counseling: Side effects reviewed, pt to contact office should one occur.
Spironolactone Pregnancy And Lactation Text: This medication can cause feminization of the male fetus and should be avoided during pregnancy. The active metabolite is also found in breast milk.
Benzoyl Peroxide Counseling: Patient counseled that medicine may cause skin irritation and bleach clothing.  In the event of skin irritation, the patient was advised to reduce the amount of the drug applied or use it less frequently.   The patient verbalized understanding of the proper use and possible adverse effects of benzoyl peroxide.  All of the patient's questions and concerns were addressed.
Winlevi Counseling:  I discussed with the patient the risks of topical clascoterone including but not limited to erythema, scaling, itching, and stinging. Patient voiced their understanding.
Detail Level: Zone
Azithromycin Pregnancy And Lactation Text: This medication is considered safe during pregnancy and is also secreted in breast milk.
Aklief counseling:  Patient advised to apply a pea-sized amount only at bedtime and wait 30 minutes after washing their face before applying.  If too drying, patient may add a non-comedogenic moisturizer.  The most commonly reported side effects including irritation, redness, scaling, dryness, stinging, burning, itching, and increased risk of sunburn.  The patient verbalized understanding of the proper use and possible adverse effects of retinoids.  All of the patient's questions and concerns were addressed.
Topical Retinoid Pregnancy And Lactation Text: This medication is Pregnancy Category C. It is unknown if this medication is excreted in breast milk.
Patient Specific Counseling (Will Not Stick From Patient To Patient): -Continue to apply clindamycin twice daily on the face, shoulders and back. Then apply as needed for maintenance \\n-Recommend using a benzoyl peroxide cleanser 2.5%. Personalized and detailed acne care flow sheet given and reviewed with patient. \\n-Recommend discontinuing alcohol consumption to prevent flares. As he report this triggers pimples. Discussed possible coexisting rosacea. \\n-if pt has any laser treatments, recommend discontinuing tretinoin 5-7 days prior to Tx.
Azelaic Acid Counseling: Patient counseled that medicine may cause skin irritation and to avoid applying near the eyes.  In the event of skin irritation, the patient was advised to reduce the amount of the drug applied or use it less frequently.   The patient verbalized understanding of the proper use and possible adverse effects of azelaic acid.  All of the patient's questions and concerns were addressed.
Tazorac Pregnancy And Lactation Text: This medication is not safe during pregnancy. It is unknown if this medication is excreted in breast milk.
Bactrim Pregnancy And Lactation Text: This medication is Pregnancy Category D and is known to cause fetal risk.  It is also excreted in breast milk.
Erythromycin Counseling:  I discussed with the patient the risks of erythromycin including but not limited to GI upset, allergic reaction, drug rash, diarrhea, increase in liver enzymes, and yeast infections.

## 2024-02-09 NOTE — HPI: PIMPLES (ACNE)
Is This A New Presentation, Or A Follow-Up?: Acne
Additional Comments (Use Complete Sentences): No longer using clindamycin because things were going well. However restarted last week.  Last time he took isotretinoin was 1 month after last office visit and he took q week for 4 weeks. He was clear on this. Using benzoyl peroxide lotion as a spot treatment. Did prp for scars.

## 2024-11-19 ENCOUNTER — APPOINTMENT (OUTPATIENT)
Dept: URBAN - METROPOLITAN AREA CLINIC 256 | Age: 29
Setting detail: DERMATOLOGY
End: 2024-11-19

## 2024-11-19 DIAGNOSIS — Z41.9 ENCOUNTER FOR PROCEDURE FOR PURPOSES OTHER THAN REMEDYING HEALTH STATE, UNSPECIFIED: ICD-10-CM

## 2024-11-19 PROCEDURE — OTHER VI PEEL: OTHER

## 2024-11-19 NOTE — PROCEDURE: VI PEEL
Comments: Purify with Precision Plus
Prep: The treated area was degreased with pre-peel cleanser, and vaseline was applied for protection of mucous membranes.
Detail Level: Zone
Post Peel Care: After the procedure, the patient was instructed not to wash the treated area for 4 hours or manually remove dead skin when the peeling process starts. Patient may use OTC hydrocortisone cream for itching.  Patient instructed to use the provided Retin-A wipes on the treated area on the 1st and 2nd nights.
Consent: Prior to the procedure, written consent was obtained and risks were reviewed, including but not limited to: redness, peeling, blistering, pigmentary change, scarring, infection, and pain. Patient is aware multiple treatments may be necessary to achieve the desired outcome.
Treatment Number: 1
Chemical Peel: VI Peel Purify

## 2024-12-03 ENCOUNTER — APPOINTMENT (OUTPATIENT)
Dept: URBAN - METROPOLITAN AREA CLINIC 256 | Age: 29
Setting detail: DERMATOLOGY
End: 2024-12-03

## 2024-12-03 VITALS — WEIGHT: 156 LBS | HEIGHT: 71 IN

## 2024-12-03 DIAGNOSIS — Z41.9 ENCOUNTER FOR PROCEDURE FOR PURPOSES OTHER THAN REMEDYING HEALTH STATE, UNSPECIFIED: ICD-10-CM

## 2024-12-03 PROCEDURE — OTHER PATIENT SPECIFIC COUNSELING: OTHER

## 2024-12-03 PROCEDURE — OTHER MIPS QUALITY: OTHER

## 2024-12-03 PROCEDURE — OTHER COSMETIC CONSULTATION: FILLERS: OTHER

## 2024-12-03 PROCEDURE — OTHER PHOTO-DOCUMENTATION: OTHER

## 2024-12-03 PROCEDURE — OTHER SKINVIVE INJECTION: OTHER

## 2024-12-03 ASSESSMENT — LOCATION DETAILED DESCRIPTION DERM
LOCATION DETAILED: LEFT MEDIAL ZYGOMA
LOCATION DETAILED: LEFT CENTRAL MALAR CHEEK
LOCATION DETAILED: RIGHT MEDIAL TEMPLE
LOCATION DETAILED: RIGHT INFERIOR CENTRAL MALAR CHEEK
LOCATION DETAILED: RIGHT CENTRAL MALAR CHEEK
LOCATION DETAILED: LEFT MEDIAL TEMPLE
LOCATION DETAILED: RIGHT SUPERIOR MEDIAL MALAR CHEEK
LOCATION DETAILED: LEFT LATERAL MALAR CHEEK
LOCATION DETAILED: RIGHT MEDIAL MALAR CHEEK
LOCATION DETAILED: RIGHT CENTRAL ZYGOMA
LOCATION DETAILED: LEFT SUPERIOR LATERAL MALAR CHEEK
LOCATION DETAILED: RIGHT SUPERIOR CENTRAL MALAR CHEEK
LOCATION DETAILED: RIGHT INFERIOR FOREHEAD
LOCATION DETAILED: LEFT INFERIOR CENTRAL MALAR CHEEK
LOCATION DETAILED: LEFT SUPERIOR CENTRAL MALAR CHEEK
LOCATION DETAILED: RIGHT SUPERIOR LATERAL MALAR CHEEK
LOCATION DETAILED: RIGHT INFERIOR MEDIAL MALAR CHEEK

## 2024-12-03 ASSESSMENT — LOCATION SIMPLE DESCRIPTION DERM
LOCATION SIMPLE: RIGHT FOREHEAD
LOCATION SIMPLE: LEFT TEMPLE
LOCATION SIMPLE: LEFT CHEEK
LOCATION SIMPLE: RIGHT CHEEK
LOCATION SIMPLE: RIGHT ZYGOMA
LOCATION SIMPLE: LEFT ZYGOMA
LOCATION SIMPLE: RIGHT TEMPLE

## 2024-12-03 ASSESSMENT — LOCATION ZONE DERM: LOCATION ZONE: FACE

## 2024-12-03 NOTE — PROCEDURE: SKINVIVE INJECTION
Anesthesia Type: 1% lidocaine with epinephrine
Use Map Statement For Sites (Optional): No
Lateral Face Filler Volume In Cc: 0
Additional Anesthesia Volume In Cc: 6
Number Of Syringes (Required For Inventory): 1
Show Inventory Tab: Show
Map Statment: See Attach Map for Complete Details
Cheeks Filler Volume In Cc: 2
Procedural Text: The filler was administered to the treatment areas noted above.
Consent: Written consent obtained. Risks include but not limited to bruising, beading, irregular texture, ulceration, infection, allergic reaction, scar formation, incomplete augmentation, temporary nature, procedural pain.
Filler: Skinvive
Detail Level: Detailed
Post-Care Instructions: Patient instructed to apply ice to reduce swelling.

## 2024-12-03 NOTE — PROCEDURE: MIPS QUALITY
Quality 431: Preventive Care And Screening: Unhealthy Alcohol Use - Screening: Patient not screened for unhealthy alcohol use using a systematic screening method
Quality 226: Preventive Care And Screening: Tobacco Use: Screening And Cessation Intervention: Patient screened for tobacco use and is an ex/non-smoker
Detail Level: Detailed

## 2024-12-03 NOTE — PROCEDURE: PATIENT SPECIFIC COUNSELING
Detail Level: Zone
-Used the needle to do some subsizing of the acne scars while treating. 1cc per cheek.\\n-Did about 3 injection points per temple to lift as well\\n-Discussed that he could massage any bumps gently tomorrow. Informed him that the Skinvive will fully integrate in about 4-6 weeks. \\n-Otherwise recheck in 4 weeks as needed.